# Patient Record
Sex: MALE | Race: WHITE | NOT HISPANIC OR LATINO | URBAN - METROPOLITAN AREA
[De-identification: names, ages, dates, MRNs, and addresses within clinical notes are randomized per-mention and may not be internally consistent; named-entity substitution may affect disease eponyms.]

---

## 2018-02-07 ENCOUNTER — HOSPITAL ENCOUNTER (EMERGENCY)
Facility: MEDICAL CENTER | Age: 38
End: 2018-02-07
Attending: EMERGENCY MEDICINE
Payer: COMMERCIAL

## 2018-02-07 VITALS
TEMPERATURE: 97.5 F | HEIGHT: 70 IN | HEART RATE: 66 BPM | OXYGEN SATURATION: 97 % | DIASTOLIC BLOOD PRESSURE: 87 MMHG | SYSTOLIC BLOOD PRESSURE: 129 MMHG | RESPIRATION RATE: 16 BRPM | BODY MASS INDEX: 23.61 KG/M2 | WEIGHT: 164.9 LBS

## 2018-02-07 DIAGNOSIS — L23.9 ALLERGIC CONTACT DERMATITIS, UNSPECIFIED TRIGGER: ICD-10-CM

## 2018-02-07 PROCEDURE — 99283 EMERGENCY DEPT VISIT LOW MDM: CPT

## 2018-02-07 NOTE — ED TRIAGE NOTES
Chief Complaint   Patient presents with   • Rash     pt reports to have abcsess to left wrist that started 5-6 days ago that started as small bumps to left wrist and now larger and draining.   • Abscess     Explained to pt triage process, made pt aware to tell this RN of any changes/concerns, pt verbalized understanding of process and instructions given. Pt to ER lobby.

## 2018-02-07 NOTE — DISCHARGE INSTRUCTIONS
Please follow-up with your primary care provider for blood pressure management.      Contact Dermatitis  Contact dermatitis is a reaction to certain substances that touch the skin. Contact dermatitis can be either irritant contact dermatitis or allergic contact dermatitis. Irritant contact dermatitis does not require previous exposure to the substance for a reaction to occur. Allergic contact dermatitis only occurs if you have been exposed to the substance before. Upon a repeat exposure, your body reacts to the substance.   CAUSES   Many substances can cause contact dermatitis. Irritant dermatitis is most commonly caused by repeated exposure to mildly irritating substances, such as:  · Makeup.  · Soaps.  · Detergents.  · Bleaches.  · Acids.  · Metal salts, such as nickel.  Allergic contact dermatitis is most commonly caused by exposure to:  · Poisonous plants.  · Chemicals (deodorants, shampoos).  · Jewelry.  · Latex.  · Neomycin in triple antibiotic cream.  · Preservatives in products, including clothing.  SYMPTOMS   The area of skin that is exposed may develop:  · Dryness or flaking.  · Redness.  · Cracks.  · Itching.  · Pain or a burning sensation.  · Blisters.  With allergic contact dermatitis, there may also be swelling in areas such as the eyelids, mouth, or genitals.   DIAGNOSIS   Your caregiver can usually tell what the problem is by doing a physical exam. In cases where the cause is uncertain and an allergic contact dermatitis is suspected, a patch skin test may be performed to help determine the cause of your dermatitis.  TREATMENT  Treatment includes protecting the skin from further contact with the irritating substance by avoiding that substance if possible. Barrier creams, powders, and gloves may be helpful. Your caregiver may also recommend:  · Steroid creams or ointments applied 2 times daily. For best results, soak the rash area in cool water for 20 minutes. Then apply the medicine. Cover the area  "with a plastic wrap. You can store the steroid cream in the refrigerator for a \"chilly\" effect on your rash. That may decrease itching. Oral steroid medicines may be needed in more severe cases.  · Antibiotics or antibacterial ointments if a skin infection is present.  · Antihistamine lotion or an antihistamine taken by mouth to ease itching.  · Lubricants to keep moisture in your skin.  · Burow's solution to reduce redness and soreness or to dry a weeping rash. Mix one packet or tablet of solution in 2 cups cool water. Dip a clean washcloth in the mixture, wring it out a bit, and put it on the affected area. Leave the cloth in place for 30 minutes. Do this as often as possible throughout the day.  · Taking several cornstarch or baking soda baths daily if the area is too large to cover with a washcloth.  Harsh chemicals, such as alkalis or acids, can cause skin damage that is like a burn. You should flush your skin for 15 to 20 minutes with cold water after such an exposure. You should also seek immediate medical care after exposure. Bandages (dressings), antibiotics, and pain medicine may be needed for severely irritated skin.   HOME CARE INSTRUCTIONS  · Avoid the substance that caused your reaction.  · Keep the area of skin that is affected away from hot water, soap, sunlight, chemicals, acidic substances, or anything else that would irritate your skin.  · Do not scratch the rash. Scratching may cause the rash to become infected.  · You may take cool baths to help stop the itching.  · Only take over-the-counter or prescription medicines as directed by your caregiver.  · See your caregiver for follow-up care as directed to make sure your skin is healing properly.  SEEK MEDICAL CARE IF:   · Your condition is not better after 3 days of treatment.  · You seem to be getting worse.  · You see signs of infection such as swelling, tenderness, redness, soreness, or warmth in the affected area.  · You have any problems " related to your medicines.     This information is not intended to replace advice given to you by your health care provider. Make sure you discuss any questions you have with your health care provider.     Document Released: 12/15/2001 Document Revised: 03/11/2013 Document Reviewed: 05/22/2012  [employer] Interactive Patient Education ©2016 [employer]

## 2018-02-07 NOTE — ED PROVIDER NOTES
"ED Provider Note    Scribed for Tay Casarez M.D. by Chet Dave. 2/7/2018  12:16 PM    Means of arrival: Walk-in  History obtained from: Patient  History limited by: None    CHIEF COMPLAINT  Chief Complaint   Patient presents with   • Rash     pt reports to have abcsess to left wrist that started 5-6 days ago that started as small bumps to left wrist and now larger and draining.   • Abscess       HPI  Manny Caban is a 37 y.o. male who presents to the Emergency Department complain of a small itchy rash on his left wrist that he first noticed five days ago. At onset he noticed bumps around the rash. This morning the patient noticed that the redness and bumps had increased in size and the rash is now draining fluid prompting him to present here today. The rash is still itchy, but has not radiated anywhere else. He denies fever or chills. Patient is a  in [place].    REVIEW OF SYSTEMS  Pertinent positives include rash, itchiness, redness, bumps, and drainage. Pertinent negatives include no fever or chills.    E    PAST MEDICAL HISTORY   None noted.    SURGICAL HISTORY  patient denies any surgical history    SOCIAL HISTORY  Social History   Substance Use Topics   • Smoking status: Former Smoker   • Smokeless tobacco: Current User     Types: Chew   • Alcohol use Yes      Comment: 3-4 x week      History   Drug Use No       FAMILY HISTORY  History reviewed. No pertinent family history.    CURRENT MEDICATIONS  Home Medications     Reviewed by Terra Jackson R.N. (Registered Nurse) on 02/07/18 at 1205  Med List Status: Complete   Medication Last Dose Status        Patient Grant Taking any Medications                       ALLERGIES  Allergies   Allergen Reactions   • Pcn [Penicillins]        PHYSICAL EXAM  VITAL SIGNS: /91   Pulse 63   Temp 36.4 °C (97.5 °F)   Resp 12   Ht 1.778 m (5' 10\")   Wt 74.8 kg (164 lb 14.5 oz)   SpO2 97%   BMI 23.66 kg/m²     Constitutional: " Well developed, Well nourished, No acute distress, Non-toxic appearance.   HENT: Normocephalic, Atraumatic.  Oropharynx moist.   Eyes: PERRL, EOMI, Conjunctiva normal, No discharge.   CV: Good pulses  Thorax & Lungs: No respiratory distress.   Skin: Warm, Dry, Left distal ulnar forearm with a 2-3 cm erythematous vesicular area with slight serous oozing. No surrounding erythema  Musculoskeletal: No major deformities noted.   Neurologic: Awake, alert. Moves all extremities spontaneously.  Psychiatric: Affect normal, Mood normal.    COURSE & MEDICAL DECISION MAKING  Nursing notes, VS, PMSFHx reviewed in chart.    12:16 PM - Patient seen and examined at bedside. I informed the patient that his rash is likely contact dermatitis. We discussed the use of over the counter hydrocortisone cream and alternate its use with the prescribed Bactroban ointment. I discussed plans for discharge with a prescription for Bactroban ointment. He was instructed to return to the ED if his symptoms worsen. Patient understands and agrees.    Decision Making:  Contact dermatitis, discussed with the patient to use cortisone cream, we'll give the patient some Bactroban if his symptoms worsen, return with any concerns.    The patient will return for new or worsening symptoms and is stable at the time of discharge.    The patient is referred to a primary physician for blood pressure management, diabetic screening, and for all other preventative health concerns.    DISPOSITION:  Patient will be discharged home in stable condition.    FOLLOW UP:  [facility], Emergency Dept  [address] [id number] [phone]    If symptoms worsen      OUTPATIENT MEDICATIONS:  Discharge Medication List as of 2/7/2018 12:35 PM      START taking these medications    Details   mupirocin (BACTROBAN) 2 % Ointment Applied affected area 2 times a day for a week, Disp-1 Tube, R-0, Print Rx Paper               FINAL IMPRESSION  1.  Allergic contact dermatitis, unspecified trigger          I, Chet Dave (Scribe), am scribing for, and in the presence of, Tay Casarez M.D..    Electronically signed by: Chet Dave (Scribe), 2/7/2018    ITay M.D. personally performed the services described in this documentation, as scribed by Chet Dave in my presence, and it is both accurate and complete.    The note accurately reflects work and decisions made by me.  Tay Casarez  2/7/2018  6:53 PM